# Patient Record
Sex: MALE | Race: BLACK OR AFRICAN AMERICAN | NOT HISPANIC OR LATINO | Employment: FULL TIME | URBAN - METROPOLITAN AREA
[De-identification: names, ages, dates, MRNs, and addresses within clinical notes are randomized per-mention and may not be internally consistent; named-entity substitution may affect disease eponyms.]

---

## 2023-08-09 ENCOUNTER — HOSPITAL ENCOUNTER (EMERGENCY)
Facility: HOSPITAL | Age: 48
Discharge: HOME | End: 2023-08-09
Attending: EMERGENCY MEDICINE | Admitting: EMERGENCY MEDICINE
Payer: COMMERCIAL

## 2023-08-09 VITALS
HEART RATE: 96 BPM | WEIGHT: 220 LBS | RESPIRATION RATE: 16 BRPM | BODY MASS INDEX: 30.8 KG/M2 | SYSTOLIC BLOOD PRESSURE: 128 MMHG | TEMPERATURE: 98.4 F | HEIGHT: 71 IN | DIASTOLIC BLOOD PRESSURE: 103 MMHG | OXYGEN SATURATION: 97 %

## 2023-08-09 DIAGNOSIS — I48.91 NEW ONSET A-FIB: Primary | ICD-10-CM

## 2023-08-09 LAB
ANION GAP SERPL CALCULATED.3IONS-SCNC: 13.7 MMOL/L (ref 5–15)
BASOPHILS # BLD AUTO: 0.03 10*3/MM3 (ref 0–0.2)
BASOPHILS NFR BLD AUTO: 0.5 % (ref 0–1.5)
BUN SERPL-MCNC: 13 MG/DL (ref 6–20)
BUN/CREAT SERPL: 10.7 (ref 7–25)
CALCIUM SPEC-SCNC: 9.6 MG/DL (ref 8.6–10.5)
CHLORIDE SERPL-SCNC: 109 MMOL/L (ref 98–107)
CO2 SERPL-SCNC: 22.3 MMOL/L (ref 22–29)
CREAT SERPL-MCNC: 1.21 MG/DL (ref 0.76–1.27)
DEPRECATED RDW RBC AUTO: 46.4 FL (ref 37–54)
EGFRCR SERPLBLD CKD-EPI 2021: 74.8 ML/MIN/1.73
EOSINOPHIL # BLD AUTO: 0.07 10*3/MM3 (ref 0–0.4)
EOSINOPHIL NFR BLD AUTO: 1.2 % (ref 0.3–6.2)
ERYTHROCYTE [DISTWIDTH] IN BLOOD BY AUTOMATED COUNT: 14.5 % (ref 12.3–15.4)
GLUCOSE SERPL-MCNC: 96 MG/DL (ref 65–99)
HCT VFR BLD AUTO: 44.1 % (ref 37.5–51)
HGB BLD-MCNC: 14.8 G/DL (ref 13–17.7)
IMM GRANULOCYTES # BLD AUTO: 0.01 10*3/MM3 (ref 0–0.05)
IMM GRANULOCYTES NFR BLD AUTO: 0.2 % (ref 0–0.5)
LYMPHOCYTES # BLD AUTO: 2.1 10*3/MM3 (ref 0.7–3.1)
LYMPHOCYTES NFR BLD AUTO: 35.2 % (ref 19.6–45.3)
MAGNESIUM SERPL-MCNC: 2.4 MG/DL (ref 1.6–2.6)
MCH RBC QN AUTO: 29.5 PG (ref 26.6–33)
MCHC RBC AUTO-ENTMCNC: 33.6 G/DL (ref 31.5–35.7)
MCV RBC AUTO: 88 FL (ref 79–97)
MONOCYTES # BLD AUTO: 0.49 10*3/MM3 (ref 0.1–0.9)
MONOCYTES NFR BLD AUTO: 8.2 % (ref 5–12)
NEUTROPHILS NFR BLD AUTO: 3.26 10*3/MM3 (ref 1.7–7)
NEUTROPHILS NFR BLD AUTO: 54.7 % (ref 42.7–76)
NRBC BLD AUTO-RTO: 0 /100 WBC (ref 0–0.2)
PLATELET # BLD AUTO: 231 10*3/MM3 (ref 140–450)
PMV BLD AUTO: 9.8 FL (ref 6–12)
POTASSIUM SERPL-SCNC: 4.2 MMOL/L (ref 3.5–5.2)
QT INTERVAL: 351 MS
RBC # BLD AUTO: 5.01 10*6/MM3 (ref 4.14–5.8)
SODIUM SERPL-SCNC: 145 MMOL/L (ref 136–145)
T4 FREE SERPL-MCNC: 1.45 NG/DL (ref 0.93–1.7)
TROPONIN T SERPL HS-MCNC: 18 NG/L
TSH SERPL DL<=0.05 MIU/L-ACNC: 1.8 UIU/ML (ref 0.27–4.2)
WBC NRBC COR # BLD: 5.96 10*3/MM3 (ref 3.4–10.8)

## 2023-08-09 PROCEDURE — 93005 ELECTROCARDIOGRAM TRACING: CPT | Performed by: EMERGENCY MEDICINE

## 2023-08-09 PROCEDURE — 80048 BASIC METABOLIC PNL TOTAL CA: CPT | Performed by: EMERGENCY MEDICINE

## 2023-08-09 PROCEDURE — 93010 ELECTROCARDIOGRAM REPORT: CPT | Performed by: INTERNAL MEDICINE

## 2023-08-09 PROCEDURE — 99284 EMERGENCY DEPT VISIT MOD MDM: CPT

## 2023-08-09 PROCEDURE — 83735 ASSAY OF MAGNESIUM: CPT | Performed by: EMERGENCY MEDICINE

## 2023-08-09 PROCEDURE — 84439 ASSAY OF FREE THYROXINE: CPT | Performed by: EMERGENCY MEDICINE

## 2023-08-09 PROCEDURE — 84484 ASSAY OF TROPONIN QUANT: CPT | Performed by: EMERGENCY MEDICINE

## 2023-08-09 PROCEDURE — 85025 COMPLETE CBC W/AUTO DIFF WBC: CPT | Performed by: EMERGENCY MEDICINE

## 2023-08-09 PROCEDURE — 84443 ASSAY THYROID STIM HORMONE: CPT | Performed by: EMERGENCY MEDICINE

## 2023-08-09 RX ORDER — METOPROLOL SUCCINATE 25 MG/1
25 TABLET, EXTENDED RELEASE ORAL DAILY
Qty: 30 TABLET | Refills: 0 | Status: SHIPPED | OUTPATIENT
Start: 2023-08-09

## 2023-08-09 RX ORDER — SODIUM CHLORIDE 0.9 % (FLUSH) 0.9 %
10 SYRINGE (ML) INJECTION AS NEEDED
Status: DISCONTINUED | OUTPATIENT
Start: 2023-08-09 | End: 2023-08-09 | Stop reason: HOSPADM

## 2023-08-09 NOTE — ED PROVIDER NOTES
EMERGENCY DEPARTMENT ENCOUNTER    Room Number:  13/13  PCP: No primary care provider on file.      HPI:  Chief Complaint: Palpitations  A complete HPI/ROS/PMH/PSH/SH/FH are unobtainable due to: None  Context: Luis Alfredo Mcguire is a 48 y.o. male who presents to the ED c/o palpitations.  Onset last night after dinner.  He states that he felt weird with his heart fluttering.  He did develop a headache shortly thereafter that improved and resolved with Excedrin.  However he is continue to have intermittent palpitations.  He denies having any chest pain or shortness of breath.  He works at [employer] in [place].  He spoke with the cardiologist colleague of his who recommended that he be seen in urgent care.  [facility] urgent care then referred him to the emergency department for evaluation.    Patient has a history of Crohn's.  He reports having 2 drinks last night.  He will occasionally have alcohol.  He will drink a cup of coffee in the morning at most in terms of caffeine consumption.  No illicit drug use.  No other stimulant use.          PAST MEDICAL HISTORY  Active Ambulatory Problems     Diagnosis Date Noted    No Active Ambulatory Problems     Resolved Ambulatory Problems     Diagnosis Date Noted    No Resolved Ambulatory Problems     No Additional Past Medical History         PAST SURGICAL HISTORY  No past surgical history on file.      FAMILY HISTORY  No family history on file.      SOCIAL HISTORY         ALLERGIES  Patient has no known allergies.        REVIEW OF SYSTEMS  Review of Systems     All systems reviewed and negative except for those discussed in HPI.       PHYSICAL EXAM  ED Triage Vitals [08/09/23 1311]   Temp Heart Rate Resp BP SpO2   98.4 øF (36.9 øC) 58 16 -- 98 %      Temp src Heart Rate Source Patient Position BP Location FiO2 (%)   Tympanic Monitor -- -- --       Physical Exam      GENERAL: no acute distress  HENT: nares patent  EYES: no scleral icterus  CV: regular  rhythm, normal rate  RESPIRATORY: normal effort, clear to auscultation bilaterally  ABDOMEN: soft, nontender  MUSCULOSKELETAL: no deformity  NEURO: alert, moves all extremities, follows commands  PSYCH:  calm, cooperative  SKIN: warm, dry    Vital signs and nursing notes reviewed.          LAB RESULTS  No results found for this or any previous visit (from the past 24 hour(s)).    Ordered the above labs and reviewed the results.        RADIOLOGY  No Radiology Exams Resulted Within Past 24 Hours    Ordered the above noted radiological studies. Reviewed by me in PACS.          PROCEDURES  Procedures          MEDICATIONS GIVEN IN ER  Medications   sodium chloride 0.9 % flush 10 mL (has no administration in time range)         MEDICAL DECISION MAKING, PROGRESS, and CONSULTS    Discussion below represents my analysis of pertinent findings related to patient's condition, differential diagnosis, treatment plan and final disposition.      Orders placed during this visit:  Orders Placed This Encounter   Procedures    Basic Metabolic Panel    High Sensitivity Troponin T    TSH    T4, Free    Magnesium    CBC Auto Differential    Monitor Blood Pressure    Pulse Oximetry, Continuous    ECG 12 Lead Rhythm Change    Insert Peripheral IV    CBC & Differential             Differential diagnosis:    Cardiac arrhythmia, hyperthyroidism, electrolyte abnormality, anxiety.  He has no chest pain or shortness of breath to suggest pulmonary embolism.          Independent interpretation of labs, radiology studies, and discussions with consultants:  ED Course as of 08/11/23 1554   Wed Aug 09, 2023   1341 EKG independently interpreted by myself.  Time 1:23 PM.  Atrial fibrillation.  Heart rate 93.  Normal axis.  Prolonged R wave progression.  Nonspecific ST changes. [TD]   1400 WBC: 5.96 [TD]   1400 Hemoglobin: 14.8 [TD]   1505 HS Troponin T(!): 18 [TD]   1505 TSH Baseline: 1.800 [TD]   1505 Free T4: 1.45 [TD]   1542 Magnesium: 2.4 [WC]   1542  Potassium: 4.2 [WC]   3651 Discussed with Dr. Zuniga sees cardiology)-he recommends Toprol XL 25 mg daily and Eliquis 5 mg twice daily. [WC]   7458   Patient can follow-up as an outpatient with cardiology. [WC]      ED Course User Index  [TD] Servando Moon II, MD  [WC] Cassius Adames MD           DIAGNOSIS  Final diagnoses:   New onset a-fib         DISPOSITION  DISCHARGE    FOLLOW-UP  [facility] CARDIOLOGY  [address]  [address]  [facility] [address] [phone]  Call            Medication List        New Prescriptions      apixaban 5 MG tablet tablet  Commonly known as: ELIQUIS  Take 1 tablet by mouth Every 12 (Twelve) Hours.     metoprolol succinate XL 25 MG 24 hr tablet  Commonly known as: TOPROL-XL  Take 1 tablet by mouth Daily.               Where to Get Your Medications        These medications were sent to [facility]/pharmacy #[id number] - [place], [place] - [address] AT [facility] - [phone] [facility] [phone]   [address], [place] [id number]      Phone: [phone]   apixaban 5 MG tablet tablet  metoprolol succinate XL 25 MG 24 hr tablet             Latest Documented Vital Signs:  As of 13:24 EDT  BP-   HR- 58 Temp- 98.4 øF (36.9 øC) (Tympanic) O2 sat- 98%      --    Please note that portions of this were completed with a voice recognition program.       Note Disclaimer: At [facility], we believe that sharing information builds trust and better relationships. You are receiving this note because you are receiving care at [facility] or recently visited. It is possible you will see health information before a provider has talked with you about it. This kind of information can be easy to misunderstand. To help you fully understand what it means for your health, we urge you to discuss this note with your provider.         Servando Moon II, MD  08/11/23 3238

## 2023-11-07 NOTE — PROGRESS NOTES
RM:________    Referral Provider: Referring, Self Provider, No Known    NEW PATIENT/ CONSULT  PREVIOUS CARDIOLOGIST: ______________________________    CARDIAC TESTING: __________________________________________________    : 1975   AGE: 48 y.o.    11/10/2023  REASON FOR VISIT/  CC:      WT: ____________ BP: __________L __________R/ HR_______________    ALLERGIES:  Patient has no known allergies.  SMOKING HISTORY          H/O: MI_____   STROKE________   GOUT_____   ANEMIA______     CAROTID________ HIV____ CAD_______ HYPERCHOL _____    H/O: CHF _____   RF____ DM___ HTN_______PVD____THYROID DISEASE_______    PMH: GI ____   HEPATITIS ___ KIDNEY DISEASE ___ LUNG DISEASE _______     SLEEP APNEA ____ BLOOD CLOTS ____ DVT ____ VEIN STRIPPING ___________      STOP BANG _________ (CARDIO ONCOLOGY ONLY)    CANCER _________________________________ CHEMO/ RADIATION__________

## 2023-11-10 ENCOUNTER — OFFICE VISIT (OUTPATIENT)
Dept: CARDIOLOGY | Facility: CLINIC | Age: 48
End: 2023-11-10
Payer: COMMERCIAL

## 2023-11-10 VITALS
WEIGHT: 219 LBS | BODY MASS INDEX: 30.66 KG/M2 | SYSTOLIC BLOOD PRESSURE: 124 MMHG | DIASTOLIC BLOOD PRESSURE: 96 MMHG | HEART RATE: 53 BPM | HEIGHT: 71 IN

## 2023-11-10 DIAGNOSIS — G47.33 OBSTRUCTIVE SLEEP APNEA SYNDROME: ICD-10-CM

## 2023-11-10 DIAGNOSIS — R03.0 PREHYPERTENSION: ICD-10-CM

## 2023-11-10 DIAGNOSIS — I48.0 PAROXYSMAL ATRIAL FIBRILLATION: Primary | ICD-10-CM

## 2023-11-10 PROBLEM — G47.30 SLEEP APNEA: Status: ACTIVE | Noted: 2023-11-10

## 2023-11-10 RX ORDER — USTEKINUMAB 90 MG/ML
INJECTION, SOLUTION SUBCUTANEOUS
COMMUNITY
Start: 2023-11-09

## 2023-11-10 NOTE — PROGRESS NOTES
Date of Office Visit: 11/10/23  Encounter Provider: Will Gaspar MD  Place of Service: [facility] CARDIOLOGY  Patient Name: Luis Alfredo Mcguire  :1975    Chief Complaint   Patient presents with    Atrial Fibrillation   :     HPI:     Mr. Mcguire is 48 y.o. and presents today in consultation for atrial fibrillation at the request of Dr Moon.    He has a history of hypertension and was previously on a thiazide diuretic, but stopped it as it caused frequent urination.  He has obstructive sleep apnea but was not wearing his device until recently.  His parents have a history of hypertension but there is no family history of coronary artery disease.    He reports always having a subjective awareness of his heart within his chest, but this is when he is very quiet.  If he is able to focus his mind on another topic or do something physical, that symptom resolves.  However, in August, he experienced a different sensation, and felt like his heart was beating hard and fast.  This lasted for several hours and persisted despite going to sleep, so he presented to the emergency department where he was diagnosed with atrial fibrillation.  He was started on metoprolol and apixaban, and he did take these for the 30 days for which they were prescribed.  His usual resting heart rate is in the 50s, and he noted that his smart watch was warning him that his heart rate was in the 40s while he was on metoprolol.    He has been dutifully wearing his CPAP since then.  He has been wearing his smart watch, which did appropriately identify his episode of atrial fibrillation on .  It has not shown any further arrhythmia warnings.  He has been checking his blood pressure and brings in a log.  His systolic pressure ranges from the 120s to the 140s, but his diastolic blood pressure is consistently elevated in the 90s.      Past Medical History:   Diagnosis Date    Crohn's disease     Glaucoma      "Prehypertension     Sleep apnea        Past Surgical History:   Procedure Laterality Date    ANKLE SURGERY Right     COLON RESECTION      EYE SURGERY Bilateral     HERNIA REPAIR         Social History     Socioeconomic History    Marital status:     Number of children: 3   Tobacco Use    Smoking status: Never     Passive exposure: Never    Smokeless tobacco: Never   Vaping Use    Vaping Use: Never used   Substance and Sexual Activity    Alcohol use: Yes     Alcohol/week: 2.0 standard drinks of alcohol     Types: 2 Shots of liquor per week    Drug use: Never       Family History   Problem Relation Age of Onset    Hypertension Mother     Stroke Father 76     Review of Systems   All other systems reviewed and are negative.    No Known Allergies      Current Outpatient Medications:     Stelara 90 MG/ML solution prefilled syringe Injection, INJECT 1 SYRINGE SUBCUTANEOUSLY EVERY 8 WEEKS. REFRIGERATE DO NOT FREEZE., Disp: , Rfl:     apixaban (ELIQUIS) 5 MG tablet tablet, Take 1 tablet by mouth Every 12 (Twelve) Hours. (Patient not taking: Reported on 11/10/2023), Disp: 60 tablet, Rfl: 0    metoprolol tartrate (LOPRESSOR) 25 MG tablet, Take 1 tab as needed for atrial fibrillation q8 hours, up to 3 doses in 24 hours, Disp: 30 tablet, Rfl: 0      Objective:     Vitals:    11/10/23 0937   BP: 124/96   BP Location: Left arm   Pulse: 53   Weight: 99.3 kg (219 lb)   Height: 180.3 cm (71\")     Body mass index is 30.54 kg/m².    Vitals reviewed.   Constitutional:       Appearance: Well-developed and not in distress.   Eyes:      Conjunctiva/sclera: Conjunctivae normal.   HENT:      Head: Normocephalic.      Nose: Nose normal.   Neck:      Thyroid: Thyroid normal.      Vascular: No JVD. JVD normal.      Lymphadenopathy: No cervical adenopathy.   Pulmonary:      Effort: Pulmonary effort is normal.      Breath sounds: Normal breath sounds.   Cardiovascular:      Normal rate. Regular rhythm.      Murmurs: There is no murmur. "   Pulses:     Intact distal pulses.   Edema:     Peripheral edema absent.   Abdominal:      Palpations: Abdomen is soft.      Tenderness: There is no abdominal tenderness.   Musculoskeletal: Normal range of motion.      Cervical back: Normal range of motion. Skin:     General: Skin is warm and dry.      Findings: No rash.   Neurological:      General: No focal deficit present.      Mental Status: Alert and oriented to person, place, and time.      Cranial Nerves: No cranial nerve deficit.   Psychiatric:         Behavior: Behavior normal.         Thought Content: Thought content normal.         Judgment: Judgment normal.         ECG 12 Lead    Date/Time: 11/10/2023 4:12 PM  Performed by: Will Gaspar MD    Authorized by: Will Gaspar MD  Comparison: compared with previous ECG   Comparison to previous ECG: SR replaces AF  Rhythm: sinus rhythm  Conduction: conduction normal  ST Segments: ST segments normal  T Waves: T waves normal  QRS axis: normal  Other: no other findings    Clinical impression: normal ECG            Assessment:       Diagnosis Plan   1. Paroxysmal atrial fibrillation  Adult Transthoracic Echo Complete W/ Cont if Necessary Per Protocol      2. Prehypertension        3. Obstructive sleep apnea syndrome              Plan:       Mr Mcguire had one episode of symptomatic atrial fibrillation in August 2023.  Notably could he feel the symptoms, but his smart watch did appropriately identify the arrhythmia.  He was briefly treated with metoprolol and apixaban and has not had any recurrent symptoms.  No obvious provoking factors such as stress, illness, or alcohol seem to have been present.  He was not using his CPAP machine, which may have been contributing.    His CHADSVASC score is 0 without a diagnosis of hypertension, but it does sound as though he probably has grade 1 hypertension.  Therefore his CHADSVASC score is 1.  With this score, the risk of major bleeding events is essentially equivalent to  the risk of embolic events.  However, it appears that his overall burden of atrial fibrillation is so low, and the one episode that he did have was quite brief; I feel that we could actually just observe things for now and see if he experiences any more symptoms or if his watch tells us that he is having atrial fibrillation.  I gave him a box of samples of apixaban to have at home in case it were to recur.  I gave him metoprolol tartrate to use as needed.  When he was taking daily metoprolol, his heart rate was quite low.    I am going to get an echocardiogram to rule out any structural abnormalities and to assess his left atrial volume.  I want him to bring his home blood pressure cuff in when he has that performed so that we can check its accuracy.  Once we confirm that, he will continue to track at home; I am hopeful that his blood pressure will improve with treatment of his sleep apnea, but if not, we will need to consider starting therapy with an angiotensin receptor blocker.  Given the presence of significant diastolic hypertension, I do think he needs a thiazide diuretic, although I note that it did cause urinary frequency in the past.    Sincerely,       Will Gaspar MD

## 2023-11-20 ENCOUNTER — CLINICAL SUPPORT (OUTPATIENT)
Dept: CARDIOLOGY | Facility: CLINIC | Age: 48
End: 2023-11-20
Payer: COMMERCIAL

## 2023-11-20 ENCOUNTER — HOSPITAL ENCOUNTER (OUTPATIENT)
Dept: CARDIOLOGY | Facility: HOSPITAL | Age: 48
Discharge: HOME | End: 2023-11-20
Admitting: INTERNAL MEDICINE
Payer: COMMERCIAL

## 2023-11-20 VITALS — DIASTOLIC BLOOD PRESSURE: 90 MMHG | SYSTOLIC BLOOD PRESSURE: 148 MMHG | HEART RATE: 51 BPM

## 2023-11-20 VITALS
HEIGHT: 71 IN | WEIGHT: 219 LBS | DIASTOLIC BLOOD PRESSURE: 84 MMHG | BODY MASS INDEX: 30.66 KG/M2 | HEART RATE: 78 BPM | SYSTOLIC BLOOD PRESSURE: 138 MMHG

## 2023-11-20 DIAGNOSIS — I48.0 PAROXYSMAL ATRIAL FIBRILLATION: ICD-10-CM

## 2023-11-20 LAB
AORTIC ARCH: 2 CM
ASCENDING AORTA: 3.3 CM
BH CV ECHO MEAS - ACS: 2.38 CM
BH CV ECHO MEAS - AO MAX PG: 6.8 MMHG
BH CV ECHO MEAS - AO MEAN PG: 3 MMHG
BH CV ECHO MEAS - AO ROOT DIAM: 3.9 CM
BH CV ECHO MEAS - AO V2 MAX: 130 CM/SEC
BH CV ECHO MEAS - AO V2 VTI: 29.7 CM
BH CV ECHO MEAS - AVA(I,D): 2.7 CM2
BH CV ECHO MEAS - EDV(CUBED): 140.6 ML
BH CV ECHO MEAS - EDV(MOD-SP2): 73 ML
BH CV ECHO MEAS - EDV(MOD-SP4): 78 ML
BH CV ECHO MEAS - EF(MOD-BP): 58.9 %
BH CV ECHO MEAS - EF(MOD-SP2): 58.9 %
BH CV ECHO MEAS - EF(MOD-SP4): 57.7 %
BH CV ECHO MEAS - ESV(CUBED): 43.5 ML
BH CV ECHO MEAS - ESV(MOD-SP2): 30 ML
BH CV ECHO MEAS - ESV(MOD-SP4): 33 ML
BH CV ECHO MEAS - FS: 32.4 %
BH CV ECHO MEAS - IVS/LVPW: 0.9 CM
BH CV ECHO MEAS - IVSD: 0.9 CM
BH CV ECHO MEAS - LAT PEAK E' VEL: 7.2 CM/SEC
BH CV ECHO MEAS - LV DIASTOLIC VOL/BSA (35-75): 35.6 CM2
BH CV ECHO MEAS - LV MASS(C)D: 181.4 GRAMS
BH CV ECHO MEAS - LV MAX PG: 3.8 MMHG
BH CV ECHO MEAS - LV MEAN PG: 2 MMHG
BH CV ECHO MEAS - LV SYSTOLIC VOL/BSA (12-30): 15.1 CM2
BH CV ECHO MEAS - LV V1 MAX: 98.1 CM/SEC
BH CV ECHO MEAS - LV V1 VTI: 22.1 CM
BH CV ECHO MEAS - LVIDD: 5.2 CM
BH CV ECHO MEAS - LVIDS: 3.5 CM
BH CV ECHO MEAS - LVOT AREA: 3.7 CM2
BH CV ECHO MEAS - LVOT DIAM: 2.16 CM
BH CV ECHO MEAS - LVPWD: 1 CM
BH CV ECHO MEAS - MED PEAK E' VEL: 6.1 CM/SEC
BH CV ECHO MEAS - MR MAX PG: 35.8 MMHG
BH CV ECHO MEAS - MR MAX VEL: 299.2 CM/SEC
BH CV ECHO MEAS - MV A DUR: 0.11 SEC
BH CV ECHO MEAS - MV A MAX VEL: 71.6 CM/SEC
BH CV ECHO MEAS - MV DEC SLOPE: 289.5 CM/SEC2
BH CV ECHO MEAS - MV DEC TIME: 0.17 SEC
BH CV ECHO MEAS - MV E MAX VEL: 54 CM/SEC
BH CV ECHO MEAS - MV E/A: 0.75
BH CV ECHO MEAS - MV MAX PG: 2.38 MMHG
BH CV ECHO MEAS - MV MEAN PG: 0.74 MMHG
BH CV ECHO MEAS - MV P1/2T: 59.9 MSEC
BH CV ECHO MEAS - MV V2 VTI: 22.5 CM
BH CV ECHO MEAS - MVA(P1/2T): 3.7 CM2
BH CV ECHO MEAS - MVA(VTI): 3.6 CM2
BH CV ECHO MEAS - PA ACC TIME: 0.14 SEC
BH CV ECHO MEAS - PA V2 MAX: 81.4 CM/SEC
BH CV ECHO MEAS - PI END-D VEL: 79.1 CM/SEC
BH CV ECHO MEAS - PULM A REVS DUR: 0.11 SEC
BH CV ECHO MEAS - PULM A REVS VEL: 34.7 CM/SEC
BH CV ECHO MEAS - PULM DIAS VEL: 43.7 CM/SEC
BH CV ECHO MEAS - PULM S/D: 0.77
BH CV ECHO MEAS - PULM SYS VEL: 33.8 CM/SEC
BH CV ECHO MEAS - QP/QS: 0.77
BH CV ECHO MEAS - RAP SYSTOLE: 3 MMHG
BH CV ECHO MEAS - RV MAX PG: 0.78 MMHG
BH CV ECHO MEAS - RV V1 MAX: 44.1 CM/SEC
BH CV ECHO MEAS - RV V1 VTI: 10.8 CM
BH CV ECHO MEAS - RVOT DIAM: 2.7 CM
BH CV ECHO MEAS - RVSP: 21 MMHG
BH CV ECHO MEAS - SI(MOD-SP2): 19.6 ML/M2
BH CV ECHO MEAS - SI(MOD-SP4): 20.5 ML/M2
BH CV ECHO MEAS - SV(LVOT): 81.2 ML
BH CV ECHO MEAS - SV(MOD-SP2): 43 ML
BH CV ECHO MEAS - SV(MOD-SP4): 45 ML
BH CV ECHO MEAS - SV(RVOT): 62.3 ML
BH CV ECHO MEAS - TAPSE (>1.6): 2.44 CM
BH CV ECHO MEAS - TR MAX PG: 18.3 MMHG
BH CV ECHO MEAS - TR MAX VEL: 214.1 CM/SEC
BH CV ECHO MEASUREMENTS AVERAGE E/E' RATIO: 8.12
BH CV XLRA - RV BASE: 3.6 CM
BH CV XLRA - RV LENGTH: 6.8 CM
BH CV XLRA - RV MID: 2.9 CM
BH CV XLRA - TDI S': 13.1 CM/SEC
LEFT ATRIUM VOLUME INDEX: 24.4 ML/M2
SINUS: 3.5 CM
STJ: 2.7 CM

## 2023-11-20 PROCEDURE — 93306 TTE W/DOPPLER COMPLETE: CPT

## 2023-11-20 PROCEDURE — 93306 TTE W/DOPPLER COMPLETE: CPT | Performed by: INTERNAL MEDICINE

## 2023-11-20 RX ORDER — CHLORTHALIDONE 25 MG/1
25 TABLET ORAL DAILY
Qty: 90 TABLET | Refills: 1 | Status: SHIPPED | OUTPATIENT
Start: 2023-11-20

## 2023-11-20 NOTE — PROGRESS NOTES
SCHEDULING -- please arrange 6m f/u with me.    Ty  jdk    His cuff reads about 10 points too high but he clearly has undertreated hypertension.  The good news about his echo is that all of his chamber sizes are normal and I did not see anything abnormal on that study or LVH.  He is going to start using his CPAP.  I think we should go ahead and start treating his hypertension, and hopefully 1 day we can come off the medicine as things get better.  However, for now, we really need to get it under control.  Based on the normal echo, I still agree with the plan that we set forth with which was as needed metoprolol tartrate and apixaban.  I will see him back in 6 months.

## 2023-11-20 NOTE — PROGRESS NOTES
Patient was here for BP re-check. Patient brought in his BP cuff from home.  Patient's cuff from home 159/103 (L) HR 53  /100 (L) 148/90 (R) cuff from the office HR  51      Dr. Gaspar reviewed patient's BP and stated patient was good to go.   Informed patient that he was good to go and Dr. Gaspar will call with his Echo results.  Patient verbalized understanding.

## 2023-11-29 ENCOUNTER — TELEPHONE (OUTPATIENT)
Dept: CARDIOLOGY | Facility: CLINIC | Age: 48
End: 2023-11-29
Payer: COMMERCIAL

## 2023-11-29 NOTE — TELEPHONE ENCOUNTER
Left voicemail for Luis Alfredo Mcguire requesting callback.    Thank you,  Cassie GUERIN RN  Triage Nurse [facility]   16:02 EST

## 2023-11-29 NOTE — TELEPHONE ENCOUNTER
Left voicemail for Luis Alfredo Mcguire requesting callback to schedule 6 month f/u with Dr. Gaspar.    Thank you,  Cassie GUERIN RN  Triage Nurse [facility]   16:04 EST

## 2023-11-29 NOTE — LETTER
Luis Alfredo Mcguire   [address] [address] [address]             December 4, 2023     The staff at [facility] are trying to reach you to schedule a 6 month follow up with Dr. Gaspar.  Please call the office at [phone] and ask to speak with scheduling at your earliest convenience.    Thank you,      [facility] Triage Department

## 2023-11-30 NOTE — TELEPHONE ENCOUNTER
Left voicemail for Luis Alfredo Mcguire requesting callback.    Thank you,  Cassie GUERIN RN  Triage Nurse [facility]   08:56 EST

## 2023-12-01 NOTE — TELEPHONE ENCOUNTER
Left voicemail for Luis Alfredo Mcguire requesting callback.    Thank you,  Cassie GUERIN RN  Triage Nurse [facility]   09:52 EST

## 2023-12-04 NOTE — TELEPHONE ENCOUNTER
Left voicemail for Luis Alfredo Mcguire requesting callback.  Letter sent.    Thank you,  Cassie GUERIN RN  Triage Nurse [facility]   09:59 EST